# Patient Record
Sex: FEMALE | Race: WHITE | NOT HISPANIC OR LATINO | URBAN - METROPOLITAN AREA
[De-identification: names, ages, dates, MRNs, and addresses within clinical notes are randomized per-mention and may not be internally consistent; named-entity substitution may affect disease eponyms.]

---

## 2022-06-20 ENCOUNTER — OFFICE VISIT (OUTPATIENT)
Dept: URBAN - METROPOLITAN AREA CLINIC 25 | Facility: CLINIC | Age: 67
End: 2022-06-20
Payer: MEDICARE

## 2022-06-20 ENCOUNTER — DASHBOARD ENCOUNTERS (OUTPATIENT)
Age: 67
End: 2022-06-20

## 2022-06-20 ENCOUNTER — WEB ENCOUNTER (OUTPATIENT)
Dept: URBAN - METROPOLITAN AREA CLINIC 25 | Facility: CLINIC | Age: 67
End: 2022-06-20

## 2022-06-20 DIAGNOSIS — E66.01 MORBID OBESITY: ICD-10-CM

## 2022-06-20 DIAGNOSIS — E78.5 HYPERLIPIDEMIA, UNSPECIFIED HYPERLIPIDEMIA TYPE: ICD-10-CM

## 2022-06-20 DIAGNOSIS — Z12.11 COLON CANCER SCREENING: ICD-10-CM

## 2022-06-20 DIAGNOSIS — R13.10 DYSPHAGIA, UNSPECIFIED TYPE: ICD-10-CM

## 2022-06-20 DIAGNOSIS — R12 HEARTBURN: ICD-10-CM

## 2022-06-20 DIAGNOSIS — I10 HYPERTENSION, UNSPECIFIED TYPE: ICD-10-CM

## 2022-06-20 PROBLEM — 38341003: Status: ACTIVE | Noted: 2022-06-20

## 2022-06-20 PROBLEM — 238136002: Status: ACTIVE | Noted: 2022-06-20

## 2022-06-20 PROBLEM — 55822004: Status: ACTIVE | Noted: 2022-06-20

## 2022-06-20 PROCEDURE — 99204 OFFICE O/P NEW MOD 45 MIN: CPT | Performed by: INTERNAL MEDICINE

## 2022-06-20 RX ORDER — ATORVASTATIN CALCIUM 10 MG/1
TAKE 1 TABLET BY MOUTH ONCE DAILY AT BEDTIME FOR 90 DAYS TABLET, FILM COATED ORAL
Qty: 90 EACH | Refills: 0 | Status: ACTIVE | COMMUNITY

## 2022-06-20 RX ORDER — LISINOPRIL 10 MG/1
TAKE 1 TABLET BY MOUTH ONCE DAILY FOR 90 DAYS TABLET ORAL
Qty: 90 EACH | Refills: 0 | Status: ACTIVE | COMMUNITY

## 2022-06-20 RX ORDER — PANTOPRAZOLE 20 MG/1
TAKE 1 TABLET BY MOUTH ONCE DAILY FOR 30 DAYS TABLET, DELAYED RELEASE ORAL
Qty: 30 EACH | Refills: 0 | Status: ACTIVE | COMMUNITY

## 2022-06-22 PROBLEM — 40739000: Status: ACTIVE | Noted: 2022-06-20

## 2022-07-01 ENCOUNTER — WEB ENCOUNTER (OUTPATIENT)
Dept: URBAN - METROPOLITAN AREA SURGERY CENTER 20 | Facility: SURGERY CENTER | Age: 67
End: 2022-07-01

## 2022-07-01 ENCOUNTER — OFFICE VISIT (OUTPATIENT)
Dept: URBAN - METROPOLITAN AREA SURGERY CENTER 20 | Facility: SURGERY CENTER | Age: 67
End: 2022-07-01
Payer: MEDICARE

## 2022-07-01 ENCOUNTER — CLAIMS CREATED FROM THE CLAIM WINDOW (OUTPATIENT)
Dept: URBAN - METROPOLITAN AREA CLINIC 4 | Facility: CLINIC | Age: 67
End: 2022-07-01
Payer: MEDICARE

## 2022-07-01 DIAGNOSIS — R13.19 CERVICAL DYSPHAGIA: ICD-10-CM

## 2022-07-01 DIAGNOSIS — K21.9 GASTRO-ESOPHAGEAL REFLUX DISEASE WITHOUT ESOPHAGITIS: ICD-10-CM

## 2022-07-01 DIAGNOSIS — K29.60 ADENOPAPILLOMATOSIS GASTRICA: ICD-10-CM

## 2022-07-01 DIAGNOSIS — K21.9 ACID REFLUX: ICD-10-CM

## 2022-07-01 PROCEDURE — 43450 DILATE ESOPHAGUS 1/MULT PASS: CPT | Performed by: INTERNAL MEDICINE

## 2022-07-01 PROCEDURE — 88305 TISSUE EXAM BY PATHOLOGIST: CPT | Performed by: PATHOLOGY

## 2022-07-01 PROCEDURE — G8907 PT DOC NO EVENTS ON DISCHARG: HCPCS | Performed by: INTERNAL MEDICINE

## 2022-07-01 PROCEDURE — 43239 EGD BIOPSY SINGLE/MULTIPLE: CPT | Performed by: INTERNAL MEDICINE

## 2022-07-01 RX ORDER — PANTOPRAZOLE 20 MG/1
TAKE 1 TABLET BY MOUTH ONCE DAILY FOR 30 DAYS TABLET, DELAYED RELEASE ORAL
Qty: 30 EACH | Refills: 0 | Status: ACTIVE | COMMUNITY

## 2022-07-01 RX ORDER — LISINOPRIL 10 MG/1
TAKE 1 TABLET BY MOUTH ONCE DAILY FOR 90 DAYS TABLET ORAL
Qty: 90 EACH | Refills: 0 | Status: ACTIVE | COMMUNITY

## 2022-07-01 RX ORDER — ATORVASTATIN CALCIUM 10 MG/1
TAKE 1 TABLET BY MOUTH ONCE DAILY AT BEDTIME FOR 90 DAYS TABLET, FILM COATED ORAL
Qty: 90 EACH | Refills: 0 | Status: ACTIVE | COMMUNITY

## 2022-07-15 ENCOUNTER — OFFICE VISIT (OUTPATIENT)
Dept: URBAN - METROPOLITAN AREA SURGERY CENTER 20 | Facility: SURGERY CENTER | Age: 67
End: 2022-07-15

## 2022-07-20 ENCOUNTER — CLAIMS CREATED FROM THE CLAIM WINDOW (OUTPATIENT)
Dept: URBAN - METROPOLITAN AREA CLINIC 4 | Facility: CLINIC | Age: 67
End: 2022-07-20
Payer: MEDICARE

## 2022-07-20 ENCOUNTER — OFFICE VISIT (OUTPATIENT)
Dept: URBAN - METROPOLITAN AREA SURGERY CENTER 20 | Facility: SURGERY CENTER | Age: 67
End: 2022-07-20
Payer: MEDICARE

## 2022-07-20 DIAGNOSIS — Z12.11 COLON CANCER SCREENING: ICD-10-CM

## 2022-07-20 DIAGNOSIS — K63.89 OTHER SPECIFIED DISEASES OF INTESTINE: ICD-10-CM

## 2022-07-20 DIAGNOSIS — K63.5 BENIGN COLON POLYP: ICD-10-CM

## 2022-07-20 PROCEDURE — 88305 TISSUE EXAM BY PATHOLOGIST: CPT | Performed by: PATHOLOGY

## 2022-07-20 PROCEDURE — 45380 COLONOSCOPY AND BIOPSY: CPT | Performed by: INTERNAL MEDICINE

## 2022-07-20 PROCEDURE — G8907 PT DOC NO EVENTS ON DISCHARG: HCPCS | Performed by: INTERNAL MEDICINE

## 2023-08-24 NOTE — PHYSICAL EXAM HENT:
Head, normocephalic, atraumatic, Face, Face within normal limits, Ears, External ears within normal limits, Nose/Nasopharynx, External nose  normal appearance, nares patent, no nasal discharge, Mouth and Throat, Oral cavity appearance normal, Breath odor normal, Lips, Appearance normal
yes